# Patient Record
Sex: MALE | Race: WHITE | Employment: FULL TIME | URBAN - NONMETROPOLITAN AREA
[De-identification: names, ages, dates, MRNs, and addresses within clinical notes are randomized per-mention and may not be internally consistent; named-entity substitution may affect disease eponyms.]

---

## 2021-03-24 ENCOUNTER — APPOINTMENT (OUTPATIENT)
Dept: GENERAL RADIOLOGY | Age: 26
End: 2021-03-24
Attending: INTERNAL MEDICINE
Payer: OTHER MISCELLANEOUS

## 2021-03-24 ENCOUNTER — HOSPITAL ENCOUNTER (EMERGENCY)
Age: 26
Discharge: HOME | End: 2021-03-24
Attending: INTERNAL MEDICINE
Payer: OTHER MISCELLANEOUS

## 2021-03-24 VITALS
WEIGHT: 236 LBS | DIASTOLIC BLOOD PRESSURE: 85 MMHG | BODY MASS INDEX: 27.87 KG/M2 | RESPIRATION RATE: 22 BRPM | TEMPERATURE: 98.3 F | HEART RATE: 114 BPM | OXYGEN SATURATION: 98 % | HEIGHT: 77 IN | SYSTOLIC BLOOD PRESSURE: 124 MMHG

## 2021-03-24 DIAGNOSIS — J98.01 ACUTE BRONCHOSPASM: Primary | ICD-10-CM

## 2021-03-24 DIAGNOSIS — Z77.098 EXPOSURE TO CHEMICAL INHALATION: ICD-10-CM

## 2021-03-24 LAB
ANION GAP SERPL CALC-SCNC: 11 MMOL/L
BASOPHILS # BLD: 0 K/UL (ref 0–0.1)
BASOPHILS NFR BLD: 0 % (ref 0–2)
BNP SERPL-MCNC: <5 PG/ML (ref 0–100)
BUN SERPL-MCNC: 14 MG/DL (ref 9–21)
BUN/CREAT SERPL: 16
CA-I BLD-MCNC: 9.5 MG/DL (ref 8.5–10.5)
CHLORIDE SERPL-SCNC: 105 MMOL/L (ref 94–111)
CO2 SERPL-SCNC: 24 MMOL/L (ref 21–33)
CREAT SERPL-MCNC: 0.9 MG/DL (ref 0.8–1.5)
EOSINOPHIL # BLD: 0.3 K/UL (ref 0–0.4)
EOSINOPHIL NFR BLD: 2 % (ref 0–5)
ERYTHROCYTE [DISTWIDTH] IN BLOOD BY AUTOMATED COUNT: 12.2 % (ref 11.6–14.5)
GLUCOSE SERPL-MCNC: 221 MG/DL (ref 70–110)
HCT VFR BLD AUTO: 46.2 % (ref 36–48)
HGB BLD-MCNC: 16.2 G/DL (ref 13–16)
IMM GRANULOCYTES # BLD AUTO: 0 K/UL
IMM GRANULOCYTES NFR BLD AUTO: 0 %
LYMPHOCYTES # BLD: 1.8 K/UL (ref 0.9–3.6)
LYMPHOCYTES NFR BLD: 13 % (ref 21–52)
MCH RBC QN AUTO: 31.2 PG (ref 24–34)
MCHC RBC AUTO-ENTMCNC: 35.1 G/DL (ref 31–37)
MCV RBC AUTO: 89 FL (ref 74–97)
MONOCYTES # BLD: 1 K/UL (ref 0.05–1.2)
MONOCYTES NFR BLD: 7 % (ref 3–10)
NEUTS SEG # BLD: 10.8 K/UL (ref 1.8–8)
NEUTS SEG NFR BLD: 78 % (ref 40–73)
PLATELET # BLD AUTO: 436 K/UL (ref 135–420)
PMV BLD AUTO: 9.9 FL (ref 9.2–11.8)
POTASSIUM SERPL-SCNC: 4 MMOL/L (ref 3.2–5.1)
RBC # BLD AUTO: 5.19 M/UL (ref 4.7–5.5)
SODIUM SERPL-SCNC: 140 MMOL/L (ref 135–145)
WBC # BLD AUTO: 13.9 K/UL (ref 4.6–13.2)

## 2021-03-24 PROCEDURE — 83880 ASSAY OF NATRIURETIC PEPTIDE: CPT

## 2021-03-24 PROCEDURE — 74011000250 HC RX REV CODE- 250: Performed by: INTERNAL MEDICINE

## 2021-03-24 PROCEDURE — 94640 AIRWAY INHALATION TREATMENT: CPT

## 2021-03-24 PROCEDURE — 71046 X-RAY EXAM CHEST 2 VIEWS: CPT

## 2021-03-24 PROCEDURE — 74011250636 HC RX REV CODE- 250/636: Performed by: INTERNAL MEDICINE

## 2021-03-24 PROCEDURE — 99283 EMERGENCY DEPT VISIT LOW MDM: CPT

## 2021-03-24 PROCEDURE — 96374 THER/PROPH/DIAG INJ IV PUSH: CPT

## 2021-03-24 PROCEDURE — 85025 COMPLETE CBC W/AUTO DIFF WBC: CPT

## 2021-03-24 PROCEDURE — 36415 COLL VENOUS BLD VENIPUNCTURE: CPT

## 2021-03-24 PROCEDURE — 80048 BASIC METABOLIC PNL TOTAL CA: CPT

## 2021-03-24 RX ORDER — ALBUTEROL SULFATE 90 UG/1
2 AEROSOL, METERED RESPIRATORY (INHALATION)
Qty: 1 INHALER | Refills: 0 | Status: SHIPPED | OUTPATIENT
Start: 2021-03-24 | End: 2021-03-29

## 2021-03-24 RX ORDER — PREDNISONE 50 MG/1
50 TABLET ORAL DAILY
Qty: 3 TAB | Refills: 0 | Status: SHIPPED | OUTPATIENT
Start: 2021-03-24 | End: 2021-03-27

## 2021-03-24 RX ORDER — LISINOPRIL 10 MG/1
20 TABLET ORAL DAILY
COMMUNITY

## 2021-03-24 RX ORDER — INSULIN LISPRO 100 [IU]/ML
INJECTION, SOLUTION INTRAVENOUS; SUBCUTANEOUS
COMMUNITY

## 2021-03-24 RX ORDER — IPRATROPIUM BROMIDE AND ALBUTEROL SULFATE 2.5; .5 MG/3ML; MG/3ML
3 SOLUTION RESPIRATORY (INHALATION)
Status: COMPLETED | OUTPATIENT
Start: 2021-03-24 | End: 2021-03-24

## 2021-03-24 RX ADMIN — IPRATROPIUM BROMIDE AND ALBUTEROL SULFATE 3 ML: .5; 3 SOLUTION RESPIRATORY (INHALATION) at 19:41

## 2021-03-24 RX ADMIN — METHYLPREDNISOLONE SODIUM SUCCINATE 80 MG: 40 INJECTION, POWDER, FOR SOLUTION INTRAMUSCULAR; INTRAVENOUS at 19:23

## 2021-03-24 NOTE — ED PROVIDER NOTES
EMERGENCY DEPARTMENT HISTORY AND PHYSICAL EXAM      Date: 3/24/2021  Patient Name: Jeri Clancy    History of Presenting Illness     Chief Complaint   Patient presents with    Shortness of Breath    Chemical exposure       History Provided By: Patient    HPI: Jrei Clancy, 22 y.o. male with a past medical history significant diabetes and hypertension presents to the ED with cc of shortness of breath status post chlorine dioxide gas inhalation. This happened 1 day ago at his workplace which is the [employer]. The chlorine dioxide in advertently mixed with water line that was under pressure in it burst and he inhaled the vapor. He was not wearing a facemask or a respirator at that time. He went to occupational health this morning and he receive oxygen therapy by nasal cannula. He has had increasing wheezing and shortness of breath since yesterday. Has had a dry cough but has not had hemoptysis, chest pain, or respiratory distress. He does not have a history of smoking cigarettes or asthma but his mom, whom is at bedside, states that she hears him wheezing. There are no other complaints, changes, or physical findings at this time. PCP: None    No current facility-administered medications on file prior to encounter. Current Outpatient Medications on File Prior to Encounter   Medication Sig Dispense Refill    lisinopriL (PRINIVIL, ZESTRIL) 10 mg tablet Take 20 mg by mouth daily.  insulin lispro (HumaLOG U-100 Insulin) 100 unit/mL injection by SubCUTAneous route. Given via insulin pump         Past History     Past Medical History:  Past Medical History:   Diagnosis Date    Diabetes ([address]     Hypertension        Past Surgical History:  Past Surgical History:   Procedure Laterality Date    HX FEMUR FRACTURE [place]         Family History:  History reviewed. No pertinent family history.     Social History:  Social History     Tobacco Use    Smoking status: Never Smoker    Smokeless tobacco: Never Used   Substance Use Topics    Alcohol use: Yes     Comment: occasionally    Drug use: Not Currently       Allergies:  No Known Allergies      Review of Systems     Review of Systems   Constitutional: Negative for activity change and chills. HENT: Negative for congestion, ear discharge, rhinorrhea, sinus pain and sore throat. Respiratory: Positive for cough (Nonproductive), chest tightness, shortness of breath and wheezing. Cardiovascular: Negative for chest pain, palpitations and leg swelling. Gastrointestinal: Negative for abdominal pain, nausea and vomiting. Genitourinary: Negative for flank pain. Musculoskeletal: Negative for back pain, myalgias and neck pain. Skin: Negative for rash. Neurological: Negative for dizziness and headaches. Psychiatric/Behavioral: Negative for behavioral problems and confusion. Physical Exam     Physical Exam  Vitals signs and nursing note reviewed. Constitutional:       General: He is not in acute distress. Appearance: He is well-developed and normal weight. He is not toxic-appearing. HENT:      Head: Normocephalic and atraumatic. Mouth/Throat:      Pharynx: No pharyngeal swelling, oropharyngeal exudate, posterior oropharyngeal erythema or uvula swelling. Tonsils: No tonsillar exudate or tonsillar abscesses. 3+ on the right. 3+ on the left. Eyes:      Extraocular Movements: Extraocular movements intact. Pupils: Pupils are equal, round, and reactive to light. Neck:      Musculoskeletal: Normal range of motion and neck supple. Cardiovascular:      Rate and Rhythm: Regular rhythm. Tachycardia present. Pulmonary:      Effort: Pulmonary effort is normal. No respiratory distress. Breath sounds: No stridor. Examination of the right-upper field reveals decreased breath sounds. Examination of the left-upper field reveals decreased breath sounds.  Examination of the right-middle field reveals decreased breath sounds and wheezing. Examination of the left-middle field reveals decreased breath sounds and wheezing. Examination of the right-lower field reveals decreased breath sounds and wheezing. Examination of the left-lower field reveals decreased breath sounds and wheezing. Decreased breath sounds and wheezing present. No rhonchi or rales. Chest:      Chest wall: No tenderness. Abdominal:      General: Abdomen is flat. Palpations: Abdomen is soft. Tenderness: There is no abdominal tenderness. There is no guarding. Hernia: No hernia is present. Musculoskeletal: Normal range of motion. General: No swelling, tenderness or deformity. Lymphadenopathy:      Cervical: No cervical adenopathy. Skin:     General: Skin is warm and dry. Capillary Refill: Capillary refill takes less than 2 seconds. Neurological:      General: No focal deficit present. Mental Status: He is alert and oriented to person, place, and time. Cranial Nerves: No cranial nerve deficit. Psychiatric:         Mood and Affect: Mood normal.         Behavior: Behavior normal.         Lab and Diagnostic Study Results     Labs -     Recent Results (from the past 12 hour(s))   CBC WITH AUTOMATED DIFF    Collection Time: 03/24/21  6:46 PM   Result Value Ref Range    WBC 13.9 (H) 4.6 - 13.2 K/uL    RBC 5.19 4.70 - 5.50 M/uL    HGB 16.2 (H) 13.0 - 16.0 g/dL    HCT 46.2 36.0 - 48.0 %    MCV 89.0 74.0 - 97.0 FL    MCH 31.2 24.0 - 34.0 PG    MCHC 35.1 31.0 - 37.0 g/dL    RDW 12.2 11.6 - 14.5 %    PLATELET 551 (H) 727 - 420 K/uL    MPV 9.9 9.2 - 11.8 FL    NEUTROPHILS 78 (H) 40 - 73 %    LYMPHOCYTES 13 (L) 21 - 52 %    MONOCYTES 7 3 - 10 %    EOSINOPHILS 2 0 - 5 %    BASOPHILS 0 0 - 2 %    IMMATURE GRANULOCYTES 0 %    ABS. NEUTROPHILS 10.8 (H) 1.8 - 8.0 K/UL    ABS. LYMPHOCYTES 1.8 0.9 - 3.6 K/UL    ABS. MONOCYTES 1.0 0.05 - 1.2 K/UL    ABS. EOSINOPHILS 0.3 0.0 - 0.4 K/UL    ABS. BASOPHILS 0.0 0.0 - 0.1 K/UL    ABS. IMM.  GRANS. 0.0 K/UL METABOLIC PANEL, BASIC    Collection Time: 03/24/21  6:46 PM   Result Value Ref Range    Sodium 140 135 - 145 mmol/L    Potassium 4.0 3.2 - 5.1 mmol/L    Chloride 105 94 - 111 mmol/L    CO2 24 21 - 33 mmol/L    Anion gap 11 mmol/L    Glucose 221 (H) 70 - 110 mg/dL    BUN 14 9 - 21 mg/dL    Creatinine 0.90 0.8 - 1.50 mg/dL    BUN/Creatinine ratio 16      GFR est AA >60 ml/min/1.73m2    GFR est non-AA >60 ml/min/1.73m2    Calcium 9.5 8.5 - 10.5 mg/dL   BNP    Collection Time: 03/24/21  6:46 PM   Result Value Ref Range    BNP <5 0 - 100 pg/mL       Radiologic Studies -   @lastxrresult@  CT Results  (Last 48 hours)    None        CXR Results  (Last 48 hours)               03/24/21 1940  XR CHEST PA LAT Final result    Impression:      No active cardiopulmonary disease. Narrative:  EXAM: CHEST, TWO VIEWS       CLINICAL INDICATION/HISTORY: shortness of breath     > Additional: History of diabetes and hypertension       COMPARISON: None. TECHNIQUE: PA and lateral views of the chest were obtained.       _______________       FINDINGS:       SUPPORT DEVICES: None. HEART AND MEDIASTINUM: Cardiomediastinal silhouette is within normal limits. Normal caliber thoracic aorta. No central vascular congestion. LUNGS AND PLEURAL SPACES: Lungs are well aerated with no confluent airspace   opacity. No pleural effusion or pneumothorax. BONY THORAX AND SOFT TISSUES: No acute osseous abnormality. _______________               CXR (prelim wet read) - no infiltrates, hyperinflated lungs    Medical Decision Making   - I am the first provider for this patient. - I reviewed the vital signs, available nursing notes, past medical history, past surgical history, family history and social history. - Initial assessment performed. The patients presenting problems have been discussed, and they are in agreement with the care plan formulated and outlined with them.   I have encouraged them to ask questions as they arise throughout their visit. Vital Signs-Reviewed the patient's vital signs. Patient Vitals for the past 12 hrs:   Temp Pulse Resp BP SpO2   03/24/21 2207 98.3 °F (36.8 °C)   124/85 98 %   03/24/21 1943     96 %   03/24/21 1843 98.3 °F (36.8 °C) (!) 114 22 (!) 142/101 97 %       Records Reviewed: Nursing Notes        ED Course:          Provider Notes (Medical Decision Making):     MDM  Number of Diagnoses or Management Options  Diagnosis management comments: This 72-year-old male presents with 24-hour history of chlorine dioxide gas exposure. Actually an aqueous solution that was aerosolized by borderline under pressure. He has no history of asthma but he has been wheezing since yesterday and he has been subjectively very short of breath. He does present with a pulse ox of 97% on room air. He has not been using any albuterol nebulizer treatments. The work-up will consist of chest x-ray, CBC, CMP. Treatment wise we will give him albuterol and Atrovent nebulizer as well as IV Solu-Medrol. As he is a diabetic type I, we will monitor his blood sugars for any possible increase. Poison control is being consulted at this time. 2100 - patient feels better after nebs and steroids. . His CXR is normal, will d/c home on albuterol MDI and prednisone taper, with close PMD follow up. Procedures   M    Disposition   Disposition: DC- Adult Discharges: All of the diagnostic tests were reviewed and questions answered. Diagnosis, care plan and treatment options were discussed. The patient understands the instructions and will follow up as directed. The patients results have been reviewed with them. They have been counseled regarding their diagnosis. The patient verbally convey understanding and agreement of the signs, symptoms, diagnosis, treatment and prognosis and additionally agrees to follow up as recommended with their PCP in 24 - 48 hours.   They also agree with the care-plan and convey that all of their questions have been answered. I have also put together some discharge instructions for them that include: 1) educational information regarding their diagnosis, 2) how to care for their diagnosis at home, as well a 3) list of reasons why they would want to return to the ED prior to their follow-up appointment, should their condition change. DISCHARGE PLAN:  1. Current Discharge Medication List      CONTINUE these medications which have NOT CHANGED    Details   lisinopriL (PRINIVIL, ZESTRIL) 10 mg tablet Take 20 mg by mouth daily. insulin lispro (HumaLOG U-100 Insulin) 100 unit/mL injection by SubCUTAneous route. Given via insulin pump           2. Follow-up Information     Follow up With Specialties Details Why Radha Ryan MD Family Medicine In 1 day for reevaluation of today's complaint [place] [id number]  [phone]          3. Return to ED if worse   4. Discharge Medication List as of 3/24/2021  9:38 PM      START taking these medications    Details   albuterol (PROVENTIL HFA, VENTOLIN HFA, PROAIR HFA) 90 mcg/actuation inhaler Take 2 Puffs by inhalation every four (4) hours as needed for Wheezing for up to 5 days. , Normal, Disp-1 Inhaler, R-0      predniSONE (DELTASONE) 50 mg tablet Take 1 Tab by mouth daily for 3 days. , Normal, Disp-3 Tab, R-0         CONTINUE these medications which have NOT CHANGED    Details   lisinopriL (PRINIVIL, ZESTRIL) 10 mg tablet Take 20 mg by mouth daily. , Historical Med      insulin lispro (HumaLOG U-100 Insulin) 100 unit/mL injection by SubCUTAneous route. Given via insulin pump, Historical Med               Diagnosis     Clinical Impression:   1. Acute bronchospasm    2. Exposure to chemical inhalation        Attestations:    Randall Miller MD    Please note that this dictation was completed with [employer], the [employer] voice recognition software.   Quite often unanticipated grammatical, syntax, homophones, and other interpretive errors are inadvertently transcribed by the computer software. Please disregard these errors. Please excuse any errors that have escaped final proofreading. Thank you.

## 2021-03-24 NOTE — ED NOTES
Bedside and Verbal shift change report given to AnjuRN (oncoming nurse) by Severa Ship, RN (offgoing nurse). Report included the following information SBAR and ED Summary.

## 2021-03-24 NOTE — LETTER
[address] EMERGENCY DEPT 
[facility] [id number] 
[phone] Work/School Note Date: 3/24/2021 To Whom It May concern: 
 
Tae Burciaga was seen and treated today in the emergency room by the following provider(s): 
Attending Provider: Ravin Saravia MD.   
 
Tae Burciaga is excused from work/school on 03/24/21 and 03/25/21. He is medically clear to return to work/school on 3/26/2021. Sincerely, Abdiaziz Evangelista MD

## 2021-03-24 NOTE — ED TRIAGE NOTES
Pt. States he was exposed to [place] yesterday that was diluted in a water Line. Pt. States he was exposed to the vapors yesterday morning around 0915. Pt. States he had a tickle in his throat afterward and then this progressed into shortness of breath through the night. Pt. Saw the [facility] health nurse at work this morning who evaluated his VS and gave him some oxygen and he felt some better. Pt. States on the way out of work he became very short of breath and he noticed his lips may have changed colors when he got to his vehicle. Pt. Called [id number] [facility] health nurse who advised him to come to the ER. [facility] health nurse has faxed MSDS to this facility.